# Patient Record
Sex: MALE | Race: WHITE | NOT HISPANIC OR LATINO
[De-identification: names, ages, dates, MRNs, and addresses within clinical notes are randomized per-mention and may not be internally consistent; named-entity substitution may affect disease eponyms.]

---

## 2019-04-15 PROBLEM — Z00.00 ENCOUNTER FOR PREVENTIVE HEALTH EXAMINATION: Status: ACTIVE | Noted: 2019-04-15

## 2019-04-22 ENCOUNTER — FORM ENCOUNTER (OUTPATIENT)
Age: 79
End: 2019-04-22

## 2019-04-23 ENCOUNTER — OUTPATIENT (OUTPATIENT)
Dept: OUTPATIENT SERVICES | Facility: HOSPITAL | Age: 79
LOS: 1 days | End: 2019-04-23
Payer: MEDICARE

## 2019-04-23 ENCOUNTER — APPOINTMENT (OUTPATIENT)
Dept: ORTHOPEDIC SURGERY | Facility: CLINIC | Age: 79
End: 2019-04-23
Payer: MEDICARE

## 2019-04-23 DIAGNOSIS — M17.10 UNILATERAL PRIMARY OSTEOARTHRITIS, UNSPECIFIED KNEE: ICD-10-CM

## 2019-04-23 PROCEDURE — 20610 DRAIN/INJ JOINT/BURSA W/O US: CPT | Mod: RT

## 2019-04-23 PROCEDURE — 99203 OFFICE O/P NEW LOW 30 MIN: CPT | Mod: 25

## 2019-04-23 PROCEDURE — 73562 X-RAY EXAM OF KNEE 3: CPT

## 2019-04-23 PROCEDURE — 73562 X-RAY EXAM OF KNEE 3: CPT | Mod: 26,LT,RT

## 2019-04-25 VITALS
SYSTOLIC BLOOD PRESSURE: 117 MMHG | DIASTOLIC BLOOD PRESSURE: 82 MMHG | HEART RATE: 75 BPM | HEIGHT: 68 IN | OXYGEN SATURATION: 99 % | BODY MASS INDEX: 33.34 KG/M2 | WEIGHT: 220 LBS

## 2019-04-25 PROBLEM — M17.10 ARTHRITIS OF KNEE: Status: ACTIVE | Noted: 2019-04-25

## 2019-04-25 NOTE — PHYSICAL EXAM
[de-identified] : X-ray bilateral knees, AP, lateral, sunrise:\par Mild to moderate joint space narrowing, mostly lateral compartment. No significant deformity. mild osteophyte formation. [de-identified] : General: MARLI, A&O x3, Appropriately Dressed\par Skin: Warm and Dry, Normal Turgor, no rashes\par Neuro: AOx3, Cranial nerves grossly intact\par Psych: Mood and affect appropriate\par \par Focused Examination of the RIGHT  knee: \par Mild effusion. Erythema noted at medial knee, this is reportedly chronic. \par No crepitus with ROM. \par Range of motion 0-120. \par Lateral joint line tenderness.\par Negative Skye's.\par Stable to varus and valgus stress. \par Negative anterior and posterior drawer. \par 5/5 strength TA/GS/EHL. \par Neurovascularly intact distally.\par \par Ambulates without assistive device.

## 2019-04-25 NOTE — ASSESSMENT
[FreeTextEntry1] : A 79-year-old male with right knee osteoarthritis and suspected meniscal tear and  leading to catching and locking\par \par -Right knee cortisone injection performed today, tolerated will....\par \par

## 2019-04-25 NOTE — HISTORY OF PRESENT ILLNESS
[de-identified] : 79-year-old male with a 2 week history of right knee pain, catching and locking with any history of injury or inciting event. Knee pain mostly occurs when going into full flexion, also with catching that occurs going from flexion to extension. Symptoms worse when getting up after inactivity. No difficulty with walking. No instability. Has not tried any medication. Has not had any recent physical therapy or recent injections. He did have a cortisone injection for the exact same issue 7 years ago which had resolved the issue until now.

## 2020-11-05 ENCOUNTER — APPOINTMENT (OUTPATIENT)
Dept: ORTHOPEDIC SURGERY | Facility: CLINIC | Age: 80
End: 2020-11-05
Payer: MEDICARE

## 2020-11-05 VITALS
SYSTOLIC BLOOD PRESSURE: 132 MMHG | BODY MASS INDEX: 31.83 KG/M2 | OXYGEN SATURATION: 96 % | HEART RATE: 64 BPM | HEIGHT: 68 IN | WEIGHT: 210 LBS | DIASTOLIC BLOOD PRESSURE: 64 MMHG | TEMPERATURE: 97.3 F

## 2020-11-05 DIAGNOSIS — Z72.3 LACK OF PHYSICAL EXERCISE: ICD-10-CM

## 2020-11-05 DIAGNOSIS — M79.661 PAIN IN RIGHT LOWER LEG: ICD-10-CM

## 2020-11-05 DIAGNOSIS — M25.561 PAIN IN RIGHT KNEE: ICD-10-CM

## 2020-11-05 PROCEDURE — 20611 DRAIN/INJ JOINT/BURSA W/US: CPT | Mod: RT

## 2020-11-05 PROCEDURE — 99072 ADDL SUPL MATRL&STAF TM PHE: CPT

## 2020-11-05 PROCEDURE — 99214 OFFICE O/P EST MOD 30 MIN: CPT | Mod: 25

## 2020-11-05 RX ORDER — CITALOPRAM 20 MG/1
20 TABLET, FILM COATED ORAL
Refills: 0 | Status: ACTIVE | COMMUNITY

## 2020-11-05 NOTE — PHYSICAL EXAM
[de-identified] : General: Well-nourished, well-developed, alert, and in no acute distress.\par Head: Normocephalic.\par Eyes: Pupils equal round reactive to light and accommodation, extraocular muscles intact, normal sclera.\par Nose: No nasal discharge.\par Cardiac: Regular rate. Extremities are warm and well perfused. Distal pulses are symmetric bilaterally.\par Respiratory: No labored breathing.\par Extremities: Sensation is intact distally bilaterally.  Distal pulses are symmetric bilaterally\par Lymphatic: No regional lymphadenopathy, no lymphedema\par Neurologic: No focal deficits\par Skin: Normal skin color, texture, and turgor\par Psychiatric: Normal affect\par MSK: as noted above/below\par \par \par \par RIGHT KNEE:\par \par Inspection: no bruising, erythema\par Joint Effusion:MILD\par ROM: Knee Flexion 125 WITH PAIN , Knee Extension 0\par Palpation:MEDIAL JOINT LINE PAIN, No pain at  patellar tendon, MFC/LFC, Medial/Lateral Tibial Plateau\par Leg Length Discrepancy:no\par Patella: no apprehension\par Distal Pulses: normal\par Lower Extremity Strength:normal, 5/5 \par Lower Extremity Reflexes:normal, 2+\par Lower Extremity Sensation: normal\par \par Special Tests:\par [facility]:POSITIVE MEDIALLY\par [facility]: POSITIVE MEDIALLY\par Anterior Drawer:Negative\par Posterior Drawer:Negative \par Varus/Valgus:Negative, no instability\par \par LEFT KNEE:\par \par Inspection: no bruising, swelling, erythema\par Joint Effusion:no \par ROM: Knee Flexion 130-140 , Knee Extension 0\par Palpation:No pain at joint line, patellar tendon, MFC/LFC, Medial/Lateral Tibial Plateau\par Leg Length Discrepancy:no\par Patella: no apprehension\par Distal Pulses: normal\par Lower Extremity Strength:normal, 5/5 \par Lower Extremity Reflexes:normal, 2+\par Lower Extremity Sensation: normal\par \par Special Tests:\par [facility]:Negative \par [facility]: Negative\par Anterior Drawer:Negative\par Posterior Drawer:Negative \par Varus/Valgus:Negative, no instability\par

## 2020-11-05 NOTE — PROCEDURE
[de-identified] : Ultrasound-Guided RIGHT Knee Arthrocentesis\par \par Indication for U/S Guidance: Ensure placement within the tibiofemoral joint for diagnostic purposes, while avoiding neurovascular structures\par \par Indication for Injection: KNEE EFFUSION\par \par A discussion was had with the patient regarding this procedure and all questions were answered. All risks, benefits and alternatives were discussed. These included but were not limited to bleeding, infection, and allergic reaction. A timeout was done to ensure correct side and pt agreed to the procedure. Betadine was used to sterilize and prep the area, and alcohol was used to clean the skin in the anterior aspect of the knee joint. The suprapatellar space was visualized utilizing the Sonosite, linear transducer. The joint was visualized in the short axis and an in-plane approach was used for the injection. Ultrasound guidance was utilized to ensure accuracy of the intra-articular aspiration, and avoid the neurovascular structures. A 25-gauge 1.5" needle was used to inject 4cc of 1% lidocaine without epi.  This was followed by aspiration with an 18-gauge 1.5" needle with aspiration of 17CC OF NONPURULENT, CLOUDY YELLOW SYNOVIAL FLUID.. A sterile bandage was then applied. The patient tolerated the procedure well and there were no complications.\par

## 2020-11-05 NOTE — HISTORY OF PRESENT ILLNESS
[de-identified] : The patient is a 80 year old man presenting with right knee pain.\par \par He was previously seen by Dr. Poon in 2019 for right knee OA and had a cortisone injection.\par \par He presents with a three week history of atraumatic, right medial knee pain after wearing new dress shoes.  The pain started with some posterior knee/calf pain.The patient denies precipitating injury or trauma.  The patient denies mechanical symptoms including catching, locking, buckling.  He endorses mild swelling.  Pain is similar to prior episodes but more severe.  He endorses swelling.  He has been using a walking cane.  He denies focal hip or low back pain.  He has develooped some calf pain and swelling.  He denies history of DVT.\par \par Pain is rated 8/10, described as sharp, improved with rest, worse with walking.\par \par  [8] : a current pain level of 8/10

## 2020-11-05 NOTE — DISCUSSION/SUMMARY
[Medication Risks Reviewed] : Medication risks reviewed [de-identified] : The patient is a 80 year old man presenting with a 3 week history of right knee pain and swelling.  Likely component of knee OA.  He has recurrent history of symptoms, cannot rule out displaced meniscal tear vs. insufficiency fracture.\par \par MRI of the right knee ordered today.\par \par Patient was prescribed a short course of Etodolac .Appropriate use of medication was reviewed with the patient in detail. Risks, benefits, and adverse effects medication were discussed.\par \par The patient was counseled on activity modification.\par \par He also has right calf pain and swelling.\par \par RLE Doppler ordered today to rule out DVT.  Patient counseled on red flags warranting presentation to ED.\par \par Follow-up after imaging.\par \par ------------------------------------------------------------------------------------------------------------------\par Patient appreciates and agrees with current plan.\par \par This note was generated using a mixture of manual typing and dragon medical dictation software.  A reasonable effort has been made for proofreading its contents, but typos may still remain.  If there are any questions or points of clarification needed please notify my office.\par \par >45 minutes of time was spent on total encounter.  >50% of the visit was spent on counseling/coordination of care and medical-decision making for this patient.\par \par \par

## 2020-11-19 ENCOUNTER — APPOINTMENT (OUTPATIENT)
Dept: ORTHOPEDIC SURGERY | Facility: CLINIC | Age: 80
End: 2020-11-19
Payer: MEDICARE

## 2020-11-19 PROCEDURE — 99213 OFFICE O/P EST LOW 20 MIN: CPT

## 2020-11-19 NOTE — DISCUSSION/SUMMARY
[Medication Risks Reviewed] : Medication risks reviewed [de-identified] : The patient is a 80 year old man presenting with a 5 week history of right knee pain and swelling likely secondary to progression of knee osteoarthritis with now mild insufficiency fracture/stress reaction of medial tibial plateau.\par \par Imaging/Diagnostics were interpreted and results were reviewed with the patient in detail.  All questions were answered appropriately.\par \par The patient was counseled on the natural progression of the problem today.  \par \par We discussed trial of bracing.  Patient provided prescription for right medial  brace.  Continue walking cane.\par \par Patient instructed to start vitamin D supplementation.\par \par Follow-up in 4 weeks with repeat Xray or sooner if change in symptoms.  We will consider HA injections in the future.\par \par ------------------------------------------------------------------------------------------------------------------\par Patient appreciates and agrees with current plan.\par \par This note was generated using a mixture of manual typing and dragon medical dictation software.  A reasonable effort has been made for proofreading its contents, but typos may still remain.  If there are any questions or points of clarification needed please notify my office.\par \par >15 minutes of time was spent on total encounter.  >50% of the visit was spent on counseling/coordination of care and medical-decision making for this patient.\par \par \par

## 2020-11-19 NOTE — HISTORY OF PRESENT ILLNESS
[de-identified] : The patient is a 80 year old man presenting for follow-up for right knee pain.\par \par He was previously seen by Dr. Poon in 2019 for right knee OA and had a cortisone injection.\par \par The patient was last seen about 2 weeks ago for a three week history of atraumatic, right medial knee pain after wearing new dress shoes.  The pain started with some posterior knee/calf pain.  He was sent for LE doppler which was negative for DVT.  He was also sent for MRI of right knee showing:\par \par Complex degenerative type tear of the posterior root, horn, and free edge of the body of the lateral meniscus with partial extrusion of the body. Degenerative type tear of the body, posterior horn, root attachment of the medial meniscus with medial extrusion. Acute on chronic grade 1 MCL sprain. Advanced medial compartment chondromalacia. Subchondral edema in the medial tibial plateau which may represent bone contusion, stress reaction, or insufficiency changes. 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 Moderate effusion with synovitis and anterior soft tissue swelling. Popliteus muscle strain.\par \par He was counseled on modified weightbearing with a walking cane.\par \par

## 2020-11-19 NOTE — PHYSICAL EXAM
[de-identified] : General: Well-nourished, well-developed, alert, and in no acute distress.\par Head: Normocephalic.\par Eyes: Pupils equal round reactive to light and accommodation, extraocular muscles intact, normal sclera.\par Nose: No nasal discharge.\par Cardiac: Regular rate. Extremities are warm and well perfused. Distal pulses are symmetric bilaterally.\par Respiratory: No labored breathing.\par Extremities: Sensation is intact distally bilaterally.  Distal pulses are symmetric bilaterally\par Lymphatic: No regional lymphadenopathy, no lymphedema\par Neurologic: No focal deficits\par Skin: Normal skin color, texture, and turgor\par Psychiatric: Normal affect\par MSK: as noted above/below\par \par \par \par RIGHT KNEE:\par \par Inspection: no bruising, erythema\par Joint Effusion:MILD\par ROM: Knee Flexion 125 WITH PAIN , Knee Extension 0\par Palpation:MEDIAL JOINT LINE PAIN, PAIN AT MEDIAL TIBIAL PLATEAU, No pain at  patellar tendon, MFC/LFC, Lateral Tibial Plateau\par Leg Length Discrepancy:no\par Patella: no apprehension\par Distal Pulses: normal\par Lower Extremity Strength:normal, 5/5 \par Lower Extremity Reflexes:normal, 2+\par Lower Extremity Sensation: normal\par \par Special Tests:\par [facility]:POSITIVE MEDIALLY\par [facility]: POSITIVE MEDIALLY\par Anterior Drawer:Negative\par Posterior Drawer:Negative \par Varus/Valgus:Negative, no instability\par \par LEFT KNEE:\par \par Inspection: no bruising, swelling, erythema\par Joint Effusion:no \par ROM: Knee Flexion 130-140 , Knee Extension 0\par Palpation:No pain at joint line, patellar tendon, MFC/LFC, Medial/Lateral Tibial Plateau\par Leg Length Discrepancy:no\par Patella: no apprehension\par Distal Pulses: normal\par Lower Extremity Strength:normal, 5/5 \par Lower Extremity Reflexes:normal, 2+\par Lower Extremity Sensation: normal\par \par Special Tests:\par [facility]:Negative \par [facility]: Negative\par Anterior Drawer:Negative\par Posterior Drawer:Negative \par Varus/Valgus:Negative, no instability\par  [de-identified] : MRI Right Knee - Multiple views were reviewed with the patient in detail. \par \par Complex degenerative type tear of the posterior root, horn, and free edge of the body of the lateral meniscus with partial extrusion of the body. Degenerative type tear of the body, posterior horn, root attachment of the medial meniscus with medial extrusion. Acute on chronic grade 1 MCL sprain. Advanced medial compartment chondromalacia. Subchondral edema in the medial tibial plateau which may represent bone contusion, stress reaction, or insufficiency changes. 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 Moderate effusion with synovitis and anterior soft tissue swelling. Popliteus muscle strain.\par

## 2020-12-17 ENCOUNTER — APPOINTMENT (OUTPATIENT)
Dept: ORTHOPEDIC SURGERY | Facility: CLINIC | Age: 80
End: 2020-12-17
Payer: MEDICARE

## 2020-12-17 VITALS
HEART RATE: 59 BPM | OXYGEN SATURATION: 98 % | HEIGHT: 68 IN | DIASTOLIC BLOOD PRESSURE: 70 MMHG | BODY MASS INDEX: 31.83 KG/M2 | TEMPERATURE: 98 F | SYSTOLIC BLOOD PRESSURE: 110 MMHG | WEIGHT: 210 LBS

## 2020-12-17 PROCEDURE — 99072 ADDL SUPL MATRL&STAF TM PHE: CPT

## 2020-12-17 PROCEDURE — 99213 OFFICE O/P EST LOW 20 MIN: CPT

## 2021-01-11 ENCOUNTER — APPOINTMENT (OUTPATIENT)
Dept: ORTHOPEDIC SURGERY | Facility: CLINIC | Age: 81
End: 2021-01-11
Payer: MEDICARE

## 2021-01-11 VITALS
TEMPERATURE: 97.4 F | HEIGHT: 68 IN | SYSTOLIC BLOOD PRESSURE: 110 MMHG | OXYGEN SATURATION: 98 % | HEART RATE: 72 BPM | BODY MASS INDEX: 31.83 KG/M2 | WEIGHT: 210 LBS | DIASTOLIC BLOOD PRESSURE: 70 MMHG

## 2021-01-11 PROCEDURE — 20610 DRAIN/INJ JOINT/BURSA W/O US: CPT | Mod: LT

## 2021-01-11 PROCEDURE — 99072 ADDL SUPL MATRL&STAF TM PHE: CPT

## 2021-01-11 NOTE — HISTORY OF PRESENT ILLNESS
[de-identified] : The patient is a 80 year old man presenting for follow-up for right knee pain.\par \par He was previously seen by Dr. Poon in 2019 for right knee OA and had a cortisone injection.\par \par The patient was last seen about 4 weeks ago for a now 7 week history of atraumatic, right medial knee pain after wearing new dress shoes.  The pain started with some posterior knee/calf pain.  He was sent for LE doppler which was negative for DVT.  He was also sent for MRI of right knee showing:\par \par Complex degenerative type tear of the posterior root, horn, and free edge of the body of the lateral meniscus with partial extrusion of the body. Degenerative type tear of the body, posterior horn, root attachment of the medial meniscus with medial extrusion. Acute on chronic grade 1 MCL sprain. Advanced medial compartment chondromalacia. Subchondral edema in the medial tibial plateau which may represent bone contusion, stress reaction, or insufficiency changes. 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 Moderate effusion with synovitis and anterior soft tissue swelling. Popliteus muscle strain.\par \par He was counseled on modified weightbearing with a walking cane, and use of a  brace.  Unfortunately, he was unable to afford the  brace.  His pain has improved about 60%, with relative rest, and he has started to wean off the cane.\par \par

## 2021-01-11 NOTE — PHYSICAL EXAM
[de-identified] : General: Well-nourished, well-developed, alert, and in no acute distress.\par Head: Normocephalic.\par Eyes: Pupils equal round reactive to light and accommodation, extraocular muscles intact, normal sclera.\par Nose: No nasal discharge.\par Cardiac: Regular rate. Extremities are warm and well perfused. Distal pulses are symmetric bilaterally.\par Respiratory: No labored breathing.\par Extremities: Sensation is intact distally bilaterally.  Distal pulses are symmetric bilaterally\par Lymphatic: No regional lymphadenopathy, no lymphedema\par Neurologic: No focal deficits\par Skin: Normal skin color, texture, and turgor\par Psychiatric: Normal affect\par MSK: as noted above/below\par \par \par \par RIGHT KNEE:\par \par Inspection: no bruising, erythema\par Joint Effusion:TRACE\par ROM: Knee Flexion 130 WITH PAIN , Knee Extension 0\par Palpation:MEDIAL JOINT LINE PAIN, PAIN AT MEDIAL TIBIAL PLATEAU, No pain at  patellar tendon, MFC/LFC, Lateral Tibial Plateau\par Leg Length Discrepancy:no\par Patella: no apprehension\par Distal Pulses: normal\par Lower Extremity Strength:normal, 5/5 \par Lower Extremity Reflexes:normal, 2+\par Lower Extremity Sensation: normal\par \par Special Tests:\par [facility]:POSITIVE MEDIALLY\par [facility]: POSITIVE MEDIALLY\par Anterior Drawer:Negative\par Posterior Drawer:Negative \par Varus/Valgus:Negative, no instability\par \par LEFT KNEE:\par \par Inspection: no bruising, swelling, erythema\par Joint Effusion:no \par ROM: Knee Flexion 130-140 , Knee Extension 0\par Palpation:No pain at joint line, patellar tendon, MFC/LFC, Medial/Lateral Tibial Plateau\par Leg Length Discrepancy:no\par Patella: no apprehension\par Distal Pulses: normal\par Lower Extremity Strength:normal, 5/5 \par Lower Extremity Reflexes:normal, 2+\par Lower Extremity Sensation: normal\par \par Special Tests:\par [facility]:Negative \par [facility]: Negative\par Anterior Drawer:Negative\par Posterior Drawer:Negative \par Varus/Valgus:Negative, no instability\par

## 2021-01-11 NOTE — DISCUSSION/SUMMARY
[Medication Risks Reviewed] : Medication risks reviewed [de-identified] : The patient is a 80 year old man presenting with a 7 week history of right knee pain and swelling likely secondary to progression of knee osteoarthritis with now mild insufficiency fracture/stress reaction of medial tibial plateau.\par \par Imaging/Diagnostics were interpreted and results were reviewed with the patient in detail.  All questions were answered appropriately.\par \par The patient was counseled on the natural progression of the problem today.  \par \par We discussed hinged knee brace as alternative to  brace.  He was given prescription today, and options for OTC braces.\par \par Patient instructed to continue vitamin D supplementation.\par \par The patient was also provided some general home exercises specifically quad sets to avoid quad atrophy.  The patient was counseled on activity modification.\par \par We will trial HA injections.  Gel-One ordered for right knee today.  If not approved by insurance, may consider cortisone injection.\par \par Patient understands that if symptoms progress, he may require surgery in the future.\par \par Follow-up in 4 weeks.\par ------------------------------------------------------------------------------------------------------------------\par Patient appreciates and agrees with current plan.\par \par This note was generated using a mixture of manual typing and dragon medical dictation software.  A reasonable effort has been made for proofreading its contents, but typos may still remain.  If there are any questions or points of clarification needed please notify my office.\par \par >15 minutes of time was spent on total encounter.  >50% of the visit was spent on counseling/coordination of care and medical-decision making for this patient.\par \par \par

## 2021-01-11 NOTE — RETURN TO WORK/SCHOOL
[Full Duty] : full duty [___ Weeks] : I will re-evaluate the patient in [unfilled] week(s), at which time I will provide an update to their current status

## 2021-01-14 ENCOUNTER — APPOINTMENT (OUTPATIENT)
Dept: ORTHOPEDIC SURGERY | Facility: CLINIC | Age: 81
End: 2021-01-14

## 2021-02-09 ENCOUNTER — APPOINTMENT (OUTPATIENT)
Dept: ORTHOPEDIC SURGERY | Facility: CLINIC | Age: 81
End: 2021-02-09
Payer: MEDICARE

## 2021-02-09 VITALS
BODY MASS INDEX: 31.83 KG/M2 | HEIGHT: 68 IN | SYSTOLIC BLOOD PRESSURE: 114 MMHG | HEART RATE: 62 BPM | WEIGHT: 210 LBS | OXYGEN SATURATION: 98 % | DIASTOLIC BLOOD PRESSURE: 72 MMHG

## 2021-02-09 VITALS — TEMPERATURE: 95.7 F

## 2021-02-09 DIAGNOSIS — M65.9 SYNOVITIS AND TENOSYNOVITIS, UNSPECIFIED: ICD-10-CM

## 2021-02-09 PROCEDURE — 20611 DRAIN/INJ JOINT/BURSA W/US: CPT | Mod: RT

## 2021-02-09 PROCEDURE — 99212 OFFICE O/P EST SF 10 MIN: CPT | Mod: 25

## 2021-02-09 PROCEDURE — 99072 ADDL SUPL MATRL&STAF TM PHE: CPT

## 2021-02-09 NOTE — DISCUSSION/SUMMARY
[Medication Risks Reviewed] : Medication risks reviewed [de-identified] : The patient is a 80 year old man presenting with a 3 month history of right knee pain and swelling likely secondary to progression of knee osteoarthritis, initially treated for mild insufficiency fracture/stress reaction of medial tibial plateau.\par \par Patient again counseled on natural progression of knee OA and treatment options.\par \par After informed consent, and explanation of risks, benefits, alternatives, adverse effects of injection, which includes but is not limited to infection, bleeding, allergic reaction, swelling, soft tissue weakening/tendon rupture, injection site complication, fat atrophy, skin depigmentation, failure to improve symptoms, the patient would like to proceed with the procedure - RIGHT KNEE ULTRASOUND-GUIDED CORTICOSTEROID INJECTION. See procedure note above. Patient tolerated the procedure well. The patient was provided with postinjection instructions.\par \par Patient understands that he may require surgery in the future.\par \par Follow-up in 6-8 weeks.  Will repeat standing knee xrays.  Pending symptom progression and repeat xrays, consider referral to Orthopedic Surgery, Dr. Poon.\par \par \par \par Patient understands that if symptoms progress, he may require surgery in the future.\par \par \par ------------------------------------------------------------------------------------------------------------------\par Patient appreciates and agrees with current plan.\par \par This note was generated using a mixture of manual typing and dragon medical dictation software.  A reasonable effort has been made for proofreading its contents, but typos may still remain.  If there are any questions or points of clarification needed please notify my office.\par \par >15 minutes of time was spent on total encounter.  >50% of the visit was spent on counseling/coordination of care and medical-decision making for this patient.\par \par \par

## 2021-02-09 NOTE — PHYSICAL EXAM
[de-identified] : General: Well-nourished, well-developed, alert, and in no acute distress.\par Head: Normocephalic.\par Eyes: Pupils equal round reactive to light and accommodation, extraocular muscles intact, normal sclera.\par Nose: No nasal discharge.\par Cardiac: Regular rate. Extremities are warm and well perfused. Distal pulses are symmetric bilaterally.\par Respiratory: No labored breathing.\par Extremities: Sensation is intact distally bilaterally.  Distal pulses are symmetric bilaterally\par Lymphatic: No regional lymphadenopathy, no lymphedema\par Neurologic: No focal deficits\par Skin: Normal skin color, texture, and turgor\par Psychiatric: Normal affect\par MSK: as noted above/below\par \par \par \par RIGHT KNEE:\par \par Inspection: no bruising, erythema\par Joint Effusion:TRACE\par ROM: Knee Flexion 130 WITH PAIN , Knee Extension 0\par Palpation:MEDIAL JOINT LINE PAIN, No pain at  patellar tendon, MFC/LFC, Lateral Tibial Plateau\par Leg Length Discrepancy:no\par Patella: no apprehension\par Distal Pulses: normal\par Lower Extremity Strength:normal, 5/5 \par Lower Extremity Reflexes:normal, 2+\par Lower Extremity Sensation: normal\par \par Special Tests:\par [facility]:NEGATIVE\par [facility]: POSITIVE MEDIALLY\par Anterior Drawer:Negative\par Posterior Drawer:Negative \par Varus/Valgus:Negative, no instability\par \par LEFT KNEE:\par \par Inspection: no bruising, swelling, erythema\par Joint Effusion:no \par ROM: Knee Flexion 130-140 , Knee Extension 0\par Palpation:No pain at joint line, patellar tendon, MFC/LFC, Medial/Lateral Tibial Plateau\par Leg Length Discrepancy:no\par Patella: no apprehension\par Distal Pulses: normal\par Lower Extremity Strength:normal, 5/5 \par Lower Extremity Reflexes:normal, 2+\par Lower Extremity Sensation: normal\par \par Special Tests:\par [facility]:Negative \par [facility]: Negative\par Anterior Drawer:Negative\par Posterior Drawer:Negative \par Varus/Valgus:Negative, no instability\par

## 2021-02-09 NOTE — HISTORY OF PRESENT ILLNESS
[de-identified] : The patient is a 80 year old man presenting for follow-up for right knee pain.\par \par He was previously seen by Dr. Poon in 2019 for right knee OA and had a cortisone injection.\par \par The patient was last seen about 4 weeks ago for a now 3 month history of atraumatic, right medial knee pain after wearing new dress shoes.  The pain started with some posterior knee/calf pain.  He was sent for LE doppler which was negative for DVT.  He was also sent for MRI of right knee showing:\par \par Complex degenerative type tear of the posterior root, horn, and free edge of the body of the lateral meniscus with partial extrusion of the body. Degenerative type tear of the body, posterior horn, root attachment of the medial meniscus with medial extrusion. Acute on chronic grade 1 MCL sprain. Advanced medial compartment chondromalacia. Subchondral edema in the medial tibial plateau which may represent bone contusion, stress reaction, or insufficiency changes. 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 Moderate effusion with synovitis and anterior soft tissue swelling. Popliteus muscle strain.\par \par He was counseled on modified weightbearing with a walking cane, and use of a  brace.  Unfortunately, he was unable to afford the  brace.  His pain has improved about 60%, with relative rest, and he has started to wean off the cane.  At his last visit, he had a Gel-One injection which provided moderate relief.  He still has minor swelling and residual pain, worse with stair use.  He has weaned off walking cane.\par \par

## 2021-02-09 NOTE — PROCEDURE
[de-identified] : Ultrasound-Guided RIGHT Knee Arthrocentesis\par \par Indication for U/S Guidance: Ensure placement within the tibiofemoral joint for diagnostic purposes, while avoiding neurovascular structures\par \par Indication for Injection: KNEE OSTEOARTHRITIS\par \par A discussion was had with the patient regarding this procedure and all questions were answered. All risks, benefits and alternatives were discussed. These included but were not limited to bleeding, infection, and allergic reaction. A timeout was done to ensure correct side and pt agreed to the procedure. Betadine was used to sterilize and prep the area, and alcohol was used to clean the skin in the anterior aspect of the knee joint. The suprapatellar space was visualized utilizing the Sonosite, linear transducer. The joint was visualized in the short axis and an in-plane approach was used for the injection. Ultrasound guidance was utilized to ensure accuracy of the intra-articular aspiration, and avoid the neurovascular structures. A 25-gauge 1.5" needle was used to inject 2cc of 1% lidocaine without epi into the SubQ.  This was followed by aspiration with an 18-gauge 1.5" needle with aspiration of 10cc of nonpurulent, clear yellow synovial fluid.  This was followed by 1cc of 1% lidocaine without epinephrine, 1cc of 0.5% bupivicaine and 1cc of KENALOG.  . A sterile bandage was then applied. The patient tolerated the procedure well and there were no complications.\par

## 2021-03-08 ENCOUNTER — APPOINTMENT (OUTPATIENT)
Dept: ORTHOPEDIC SURGERY | Facility: CLINIC | Age: 81
End: 2021-03-08

## 2021-04-06 ENCOUNTER — APPOINTMENT (OUTPATIENT)
Dept: ORTHOPEDIC SURGERY | Facility: CLINIC | Age: 81
End: 2021-04-06

## 2021-04-15 ENCOUNTER — APPOINTMENT (OUTPATIENT)
Dept: ORTHOPEDIC SURGERY | Facility: CLINIC | Age: 81
End: 2021-04-15

## 2021-04-22 ENCOUNTER — APPOINTMENT (OUTPATIENT)
Dept: ORTHOPEDIC SURGERY | Facility: CLINIC | Age: 81
End: 2021-04-22
Payer: MEDICARE

## 2021-04-22 VITALS
BODY MASS INDEX: 31.83 KG/M2 | HEIGHT: 68 IN | TEMPERATURE: 98 F | OXYGEN SATURATION: 98 % | DIASTOLIC BLOOD PRESSURE: 70 MMHG | HEART RATE: 61 BPM | SYSTOLIC BLOOD PRESSURE: 134 MMHG | WEIGHT: 210 LBS

## 2021-04-22 PROCEDURE — 20611 DRAIN/INJ JOINT/BURSA W/US: CPT | Mod: RT

## 2021-04-22 PROCEDURE — 99072 ADDL SUPL MATRL&STAF TM PHE: CPT

## 2021-04-22 PROCEDURE — 99212 OFFICE O/P EST SF 10 MIN: CPT | Mod: 25

## 2021-04-22 NOTE — DISCUSSION/SUMMARY
[de-identified] : The patient is a 80 year old man presenting with a 4 month history of right knee pain and swelling likely secondary to progression of knee osteoarthritis, initially treated for mild insufficiency fracture/stress reaction of medial tibial plateau.\par \par He also has mild left knee OA.\par \par We again discussed treatment options for knee OA.  Patient understands that they may require surgery in the future.  He does not desire surgery at this time.\par \par He defers supervised PT.\par \par After informed consent, and explanation of risks, benefits, alternatives, adverse effects of injection, which includes but is not limited to infection, bleeding, allergic reaction, swelling, soft tissue weakening/tendon rupture, injection site complication, fat atrophy, skin depigmentation, failure to improve symptoms, the patient would like to proceed with the procedure - RIGHT KNEE ULTRASOUND-GUIDED ARTHROCENTESIS. See procedure note above. Patient tolerated the procedure well. The patient was provided with postinjection instructions.\par \par Trial HA injection left knee.  Repeat HA for right knee in 3 months\par \par MONOVISC ordered today.\par \par ------------------------------------------------------------------------------------------------------------------\par Patient appreciates and agrees with current plan.\par \par The patient's diagnosis above was evaluated by me, personally.  Diagnostic Testing and treatment options were discussed with the patient in detail.  The risks/benefits/potential complications of diagnostic testing/treatments were described in detail.  \par \par This note was generated using a mixture of manual typing and dragon medical dictation software.  A reasonable effort has been made for proofreading its contents, but typos may still remain.  If there are any questions or points of clarification needed please notify my office.\par \par \par >15 minutes of time was spent in total for the encounter.  >50% of the time spent was on face-to-face counseling/coordination of care and medical-decision making for this patient.\par \par \par \par \par ------------------------------------------------------------------------------------------------------------------\par Patient appreciates and agrees with current plan.\par \par This note was generated using a mixture of manual typing and dragon medical dictation software.  A reasonable effort has been made for proofreading its contents, but typos may still remain.  If there are any questions or points of clarification needed please notify my office.\par \par >15 minutes of time was spent on total encounter.  >50% of the visit was spent on counseling/coordination of care and medical-decision making for this patient.\par \par \par

## 2021-04-22 NOTE — HISTORY OF PRESENT ILLNESS
[de-identified] : The patient is a 80 year old man presenting for follow-up for right knee pain.\par \par He was previously seen by Dr. Poon in 2019 for right knee OA and had a cortisone injection.\par \par The patient was last seen about 8 weeks ago for a now 4 month history of atraumatic, right medial knee pain after wearing new dress shoes.  The pain started with some posterior knee/calf pain.  He was sent for LE doppler which was negative for DVT.  He was also sent for MRI of right knee showing:\par \par Complex degenerative type tear of the posterior root, horn, and free edge of the body of the lateral meniscus with partial extrusion of the body. Degenerative type tear of the body, posterior horn, root attachment of the medial meniscus with medial extrusion. Acute on chronic grade 1 MCL sprain. Advanced medial compartment chondromalacia. Subchondral edema in the medial tibial plateau which may represent bone contusion, stress reaction, or insufficiency changes. 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 Moderate effusion with synovitis and anterior soft tissue swelling. Popliteus muscle strain.\par \par He was counseled on modified weightbearing with a walking cane, and use of a  brace.  Unfortunately, he was unable to afford the  brace.  This was followed by Gel-One injection, and ultimately a corticosteroid injection.  \par \par He feels about 50% with walking and ~30% with stair use.  HA injections provided moderate relief.\par \par

## 2021-04-22 NOTE — PHYSICAL EXAM
[de-identified] : General: Well-nourished, well-developed, alert, and in no acute distress.\par Head: Normocephalic.\par Eyes: Pupils equal round reactive to light and accommodation, extraocular muscles intact, normal sclera.\par Nose: No nasal discharge.\par Cardiac: Regular rate. Extremities are warm and well perfused. Distal pulses are symmetric bilaterally.\par Respiratory: No labored breathing.\par Extremities: Sensation is intact distally bilaterally.  Distal pulses are symmetric bilaterally\par Lymphatic: No regional lymphadenopathy, no lymphedema\par Neurologic: No focal deficits\par Skin: Normal skin color, texture, and turgor\par Psychiatric: Normal affect\par MSK: as noted above/below\par \par \par \par RIGHT KNEE:\par \par Inspection: no bruising, erythema\par Joint Effusion:MODERATE\par ROM: Knee Flexion 125 , Knee Extension 0\par Palpation:MEDIAL JOINT LINE PAIN, No pain at  patellar tendon, MFC/LFC, Lateral Tibial Plateau\par Leg Length Discrepancy:no\par Patella: no apprehension\par Distal Pulses: normal\par Lower Extremity Strength:normal, 5/5 \par Lower Extremity Reflexes:normal, 2+\par Lower Extremity Sensation: normal\par \par Special Tests:\par [facility]:NEGATIVE\par Bradley: NEGATIVE\par Anterior Drawer:Negative\par Posterior Drawer:Negative \par Varus/Valgus:Negative, no instability\par \par LEFT KNEE:\par \par Inspection: no bruising, swelling, erythema\par Joint Effusion:no \par ROM: Knee Flexion 130-140 , Knee Extension 0\par Palpation:No pain at joint line, patellar tendon, MFC/LFC, Medial/Lateral Tibial Plateau\par Leg Length Discrepancy:no\par Patella: no apprehension\par Distal Pulses: normal\par Lower Extremity Strength:normal, 5/5 \par Lower Extremity Reflexes:normal, 2+\par Lower Extremity Sensation: normal\par \par Special Tests:\par [facility]:Negative \par Bradley: Negative\par Anterior Drawer:Negative\par Posterior Drawer:Negative \par Varus/Valgus:Negative, no instability\par

## 2021-04-22 NOTE — PROCEDURE
[de-identified] : Ultrasound-Guided RIGHT Knee Arthrocentesis\par \par Indication for U/S Guidance: Ensure placement within the tibiofemoral joint for diagnostic purposes, while avoiding neurovascular structures\par \par Indication for Injection: KNEE EFFUSION, KNEE OA\par \par A discussion was had with the patient regarding this procedure and all questions were answered. All risks, benefits and alternatives were discussed. These included but were not limited to bleeding, infection, and allergic reaction. A timeout was done to ensure correct side and pt agreed to the procedure. Betadine was used to sterilize and prep the area, and alcohol was used to clean the skin in the anterior aspect of the knee joint. The suprapatellar space was visualized utilizing the Sonosite, linear transducer. The joint was visualized in the short axis and an in-plane approach was used for the injection. Ultrasound guidance was utilized to ensure accuracy of the intra-articular aspiration, and avoid the neurovascular structures. A 25-gauge 1.5" needle was used to inject 2cc of 1% lidocaine without epi into the SubQ.  This was followed by aspiration with an 18-gauge 1.5" needle with aspiration of 20cc of nonpurulent, clear yellow synovial fluid. . A sterile bandage was then applied. The patient tolerated the procedure well and there were no complications.\par

## 2022-03-07 ENCOUNTER — OUTPATIENT (OUTPATIENT)
Dept: OUTPATIENT SERVICES | Facility: HOSPITAL | Age: 82
LOS: 1 days | End: 2022-03-07
Payer: MEDICARE

## 2022-03-07 ENCOUNTER — RESULT REVIEW (OUTPATIENT)
Age: 82
End: 2022-03-07

## 2022-03-07 ENCOUNTER — EMERGENCY (EMERGENCY)
Facility: HOSPITAL | Age: 82
LOS: 1 days | Discharge: HOME | End: 2022-03-07
Admitting: EMERGENCY MEDICINE
Payer: MEDICARE

## 2022-03-07 ENCOUNTER — APPOINTMENT (OUTPATIENT)
Dept: ORTHOPEDIC SURGERY | Facility: CLINIC | Age: 82
End: 2022-03-07
Payer: MEDICARE

## 2022-03-07 VITALS
HEART RATE: 62 BPM | DIASTOLIC BLOOD PRESSURE: 67 MMHG | HEIGHT: 68 IN | RESPIRATION RATE: 18 BRPM | WEIGHT: 220.02 LBS | TEMPERATURE: 98 F | OXYGEN SATURATION: 98 % | SYSTOLIC BLOOD PRESSURE: 135 MMHG

## 2022-03-07 VITALS
HEIGHT: 68 IN | SYSTOLIC BLOOD PRESSURE: 142 MMHG | WEIGHT: 210 LBS | DIASTOLIC BLOOD PRESSURE: 84 MMHG | TEMPERATURE: 97.9 F | BODY MASS INDEX: 31.83 KG/M2 | HEART RATE: 66 BPM | OXYGEN SATURATION: 97 %

## 2022-03-07 VITALS
RESPIRATION RATE: 18 BRPM | SYSTOLIC BLOOD PRESSURE: 141 MMHG | HEART RATE: 60 BPM | TEMPERATURE: 98 F | DIASTOLIC BLOOD PRESSURE: 81 MMHG | OXYGEN SATURATION: 97 %

## 2022-03-07 DIAGNOSIS — M17.11 UNILATERAL PRIMARY OSTEOARTHRITIS, RIGHT KNEE: ICD-10-CM

## 2022-03-07 DIAGNOSIS — M17.12 UNILATERAL PRIMARY OSTEOARTHRITIS, LEFT KNEE: ICD-10-CM

## 2022-03-07 PROCEDURE — 99212 OFFICE O/P EST SF 10 MIN: CPT | Mod: 25

## 2022-03-07 PROCEDURE — 73564 X-RAY EXAM KNEE 4 OR MORE: CPT | Mod: 26,50

## 2022-03-07 PROCEDURE — 99282 EMERGENCY DEPT VISIT SF MDM: CPT

## 2022-03-07 PROCEDURE — 20611 DRAIN/INJ JOINT/BURSA W/US: CPT | Mod: LT

## 2022-03-07 PROCEDURE — 73564 X-RAY EXAM KNEE 4 OR MORE: CPT

## 2022-03-07 NOTE — ED ADULT NURSE NOTE - DRUG PRE-SCREENING (DAST -1)
[facility]  [address] [address]  Phone: [phone]  Fax: [phone]    Eugene Selby        January 24, 2022     Patient: Oseas Kilgore   YOB: 1983   Date of Visit: 1/24/2022       To Whom it May Concern:    Ave Craven was seen in my clinic on 1/24/2022. He may return to work without any restrictions. If you have any questions or concerns, please don't hesitate to call.     Sincerely,         Jenelle Howard DPM Statement Selected

## 2022-03-07 NOTE — ED PROVIDER NOTE - PHYSICAL EXAMINATION
CONSTITUTIONAL: Well-developed; well-nourished; in no acute distress.  SKIN: Warm and dry, no acute rash.  HEAD: Normocephalic; atraumatic.  EYES: PERRL, EOM intact; conjunctiva and sclera clear.  ENT: No nasal discharge; airway clear.  NECK: Supple; non tender.  CARD: S1, S2 normal; no murmurs, gallops, or rubs. Regular rate and rhythm.  RESP: No wheezes, rales or rhonchi.  ABD: Normal bowel sounds; soft; non-distended; non-tender; no hepatosplenomegaly.  EXT: Normal ROM. No clubbing, cyanosis or edema. Sensation and reflex intact. No skin changes, no ttp or increased warmth, 2+ pulse and good cap refill  LYMPH: No acute cervical adenopathy.  NEURO: Alert, oriented. Grossly unremarkable.  PSYCH: Cooperative, appropriate.

## 2022-03-07 NOTE — ED PROVIDER NOTE - NSFOLLOWUPINSTRUCTIONS_ED_ALL_ED_FT
Return to the Emergency Department if you have any new or worsening symptoms, or if you have any concerns.    Please reach out to Coco Walls ([facility] ED clinical referral coordinator) to assist you with your follow-up appointment with Podiatry.     Monday - Friday 11am-7pm  [phone]  [email or web address]

## 2022-03-07 NOTE — PHYSICAL EXAM
[de-identified] : General: Well-nourished, well-developed, alert, and in no acute distress.\par Head: Normocephalic.\par Eyes: Pupils equal round reactive to light and accommodation, extraocular muscles intact, normal sclera.\par Nose: No nasal discharge.\par Cardiac: Regular rate. Extremities are warm and well perfused. Distal pulses are symmetric bilaterally.\par Respiratory: No labored breathing.\par Extremities: Sensation is intact distally bilaterally.  Distal pulses are symmetric bilaterally\par Lymphatic: No regional lymphadenopathy, no lymphedema\par Neurologic: No focal deficits\par Skin: Normal skin color, texture, and turgor\par Psychiatric: Normal affect\par MSK: as noted above/below\par \par \par \par RIGHT KNEE:\par \par Inspection: no bruising, erythema\par Joint Effusion:NONE\par ROM: Knee Flexion 125 , Knee Extension 0\par Palpation:MEDIAL JOINT LINE PAIN, No pain at  patellar tendon, MFC/LFC, Lateral Tibial Plateau\par Leg Length Discrepancy:no\par Patella: no apprehension\par Distal Pulses: normal\par Lower Extremity Strength:normal, 5/5 \par Lower Extremity Reflexes:normal, 2+\par Lower Extremity Sensation: normal\par \par Special Tests:\par [facility]:NEGATIVE\par Bradley: NEGATIVE\par Anterior Drawer:Negative\par Posterior Drawer:Negative \par Varus/Valgus:Negative, no instability\par \par LEFT KNEE:\par \par Inspection: no bruising, erythema\par Joint Effusion:MILD\par ROM: Knee Flexion 120 , Knee Extension 0\par Palpation:MEDIAL JOINT LINE PAIN, PERIPATELLAR PAIN, NO PAIN AT patellar tendon, MFC/LFC, Medial/Lateral Tibial Plateau\par Leg Length Discrepancy:no\par Patella: no apprehension\par Distal Pulses: normal\par Lower Extremity Strength:normal, 5/5 \par Lower Extremity Reflexes:normal, 2+\par Lower Extremity Sensation: normal\par \par Special Tests:\par [facility]:Negative \par Bradley: Negative\par Anterior Drawer:Negative\par Posterior Drawer:Negative \par Varus/Valgus:Negative, no instability\par  [de-identified] : Xray Bilateral Knees - Multiple views were reviewed with the patient in detail.  There is no acute fracture or dislocation.  There is left knee effusion.  Bilateral tricompartment osteoarthritis, worse on right.  We will await formal radiology reading.\par \par

## 2022-03-07 NOTE — HISTORY OF PRESENT ILLNESS
[de-identified] : The patient is a 80 year old man presenting for follow-up for knee pain.\par \par He was previously seen by Dr. Poon in 2019 for right knee OA and had a cortisone injection.\par \par The patient was last seen about 11 months ago for chronic, atraumatic, right medial knee pain after wearing new dress shoes.  The pain started with some posterior knee/calf pain.  He was sent for LE doppler which was negative for DVT.  He was also sent for MRI of right knee showing:\par \par Complex degenerative type tear of the posterior root, horn, and free edge of the body of the lateral meniscus with partial extrusion of the body. Degenerative type tear of the body, posterior horn, root attachment of the medial meniscus with medial extrusion. Acute on chronic grade 1 MCL sprain. Advanced medial compartment chondromalacia. Subchondral edema in the medial tibial plateau which may represent bone contusion, stress reaction, or insufficiency changes. 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 Moderate effusion with synovitis and anterior soft tissue swelling. Popliteus muscle strain.\par \par He was counseled on modified weightbearing with a walking cane, and use of a  brace.   This was followed by Gel-One injection, and ultimately a corticosteroid injection.    His right knee has been stable.\par \par He now presents with a several week history of subacute, atraumatic left knee pain and swelling.  No new injury.  He has known history of left knee OA.\par \par \par

## 2022-03-07 NOTE — PROCEDURE
[de-identified] : Ultrasound-Guided LEFT Knee Arthrocentesis\par \par Indication for U/S Guidance: Ensure placement within the tibiofemoral joint for diagnostic purposes, while avoiding neurovascular structures\par \par Indication for Injection: KNEE EFFUSION, KNEE OA\par \par A discussion was had with the patient regarding this procedure and all questions were answered. All risks, benefits and alternatives were discussed. These included but were not limited to bleeding, infection, and allergic reaction. A timeout was done to ensure correct side and pt agreed to the procedure. Betadine was used to sterilize and prep the area, and alcohol was used to clean the skin in the anterior aspect of the knee joint. The suprapatellar space was visualized utilizing the Sonosite, linear transducer. The joint was visualized in the short axis and an in-plane approach was used for the injection. Ultrasound guidance was utilized to ensure accuracy of the intra-articular aspiration, and avoid the neurovascular structures. A 25-gauge 1.5" needle was used to inject 2cc of 1% lidocaine without epi into the SubQ.  This was followed by aspiration with an 18-gauge 1.5" needle with aspiration of 15cc of nonpurulent, clear yellow synovial fluid. . This was followed by 2cc of 1% lidocaine without epi, 0.5cc of 0.5% bupivicaine and 1cc of kenalog.  A sterile bandage was then applied. The patient tolerated the procedure well and there were no complications.\par

## 2022-03-07 NOTE — ED ADULT NURSE NOTE - OBJECTIVE STATEMENT
81y Male c/o L ankle pain x 1 week. Pmhx HLD. Pt reports " I caught a cold in my L ankle. A week ago, I slept with my foot uncovered, and now I cant sleep because of the pain". Pt reports sensitivity to cold air and to bed sheets. No obvious deformity noted. Denies any injury, B/l pulses and sensation present, full range of motion, able to bear weight on affected extremity. Pt denies Fevers, chills. N/V/D.

## 2022-03-07 NOTE — ED PROVIDER NOTE - OBJECTIVE STATEMENT
82 y/o male with a PMHx of HLD (simvastatin) is here in the ED c/o discomfort to his left ankle x1 week. Pt states the pain started the morning after he forgot to place to his foot under the warm covers. Pt states he has a "cold" on his ankle that he googled online. Pt reports the pain has caused him trouble sleeping. He has been taking melatonin without improvement of his sleep or discomfort to his foot. He denies the following: fever, chills, numbness/tingling to his foot, swelling, redness, itching, fall/injury, swelling, skin changes. Pt admits he is able to walk without any issues or pain.

## 2022-03-07 NOTE — ED PROVIDER NOTE - PATIENT PORTAL LINK FT
You can access the FollowMyHealth Patient Portal offered by [facility] by registering at the following website: [email or web address]. By joining [employer]’s FollowMyHealth portal, you will also be able to view your health information using other applications (apps) compatible with our system.

## 2022-03-07 NOTE — ED PROVIDER NOTE - CARE PROVIDER_API CALL
Paco Sullivan (LISEM)  Podiatric Medicine and Surgery  [address], [address]  [place] [address]  Phone: [phone]  Fax: [phone]  Follow Up Time:

## 2022-03-07 NOTE — ED PROVIDER NOTE - CLINICAL SUMMARY MEDICAL DECISION MAKING FREE TEXT BOX
80 y/o male here in the ED c/o about discomfort to his left foot that has interfered with his sleep. No sxs of injury, therefore pt agrees no xray indicated. Not consistent with claudication, venous insufficiency, septic joint, cellulitis. Denies pain with walking. Pt otherwise NVI and soft compartment. Unremarkable exam. Pt advised to follow-up with Podiatry. I have discussed the discharge plan with the patient. The patient agrees with the plan, as discussed.  The patient understands Emergency Department diagnosis is a preliminary diagnosis often based on limited information and that the patient must adhere to the follow-up plan as discussed.  The patient understands that if the symptoms worsen or if prescribed medications do not have the desired/planned effect that the patient may return to the Emergency Department at any time for further evaluation and treatment.

## 2022-03-07 NOTE — DISCUSSION/SUMMARY
[Medication Risks Reviewed] : Medication risks reviewed [de-identified] : The patient is a 80 year old man presenting with chronic left knee pain secondary to knee osteoarthritis.  Also right knee OA, but asymptomatic today.\par \par We again discussed natural progression of arthritis, as well as nonoperative and operative treatment options.\par \par Patient understands that they may require surgery in the future.  He defers for now.\par \par After informed consent, and explanation of risks, benefits, alternatives, adverse effects of injection, which includes but is not limited to infection, bleeding, allergic reaction, swelling, soft tissue weakening/tendon rupture, neurovascular injury, injection site complication, fat atrophy, skin depigmentation, failure to improve symptoms, the patient would like to proceed with the procedure - LEFT KNEE ASPIRATION/CSI. See procedure note above. Patient tolerated the procedure well. The patient was provided with postinjection instructions.\par \par Trial HA injections.\par \par Bilateral MONOVISC injections ordered today.\par \par Follow-up in 8 weeks.\par \par ------------------------------------------------------------------------------------------------------------------\par Patient appreciates and agrees with current plan.\par \par The patient's diagnosis above was evaluated by me, personally.  Diagnostic Testing and treatment options were discussed with the patient in detail.  The risks/benefits/potential complications of diagnostic testing/treatments were described in detail.  \par \par This note was generated using a mixture of manual typing and dragon medical dictation software.  A reasonable effort has been made for proofreading its contents, but typos may still remain.  If there are any questions or points of clarification needed please notify my office.\par \par \par >15 minutes of time was spent in total for the encounter.  >50% of the time spent was on face-to-face counseling/coordination of care and medical-decision making for this patient.\par \par \par \par \par ------------------------------------------------------------------------------------------------------------------\par Patient appreciates and agrees with current plan.\par \par This note was generated using a mixture of manual typing and dragon medical dictation software.  A reasonable effort has been made for proofreading its contents, but typos may still remain.  If there are any questions or points of clarification needed please notify my office.\par \par >15 minutes of time was spent on total encounter.  >50% of the visit was spent on counseling/coordination of care and medical-decision making for this patient.\par \par \par

## 2022-03-07 NOTE — ED PROVIDER NOTE - NS ED ROS FT
General: no fever, chills, confusion  Cardiac: no chest pain, chest tightness, palpitations  Lungs: no sob, difficulty breathing  Abdomen: no abdominal pain, nausea, vomiting, diarrhea, constipation, nml BM  : no dysuria, urinary frequency/urgency  MSK: [I have a "cold" on my left ankle]    All other systems negative except as per HPI

## 2022-03-10 DIAGNOSIS — M25.572 PAIN IN LEFT ANKLE AND JOINTS OF LEFT FOOT: ICD-10-CM

## 2022-03-10 DIAGNOSIS — E78.5 HYPERLIPIDEMIA, UNSPECIFIED: ICD-10-CM

## 2022-03-18 PROBLEM — Z78.9 OTHER SPECIFIED HEALTH STATUS: Chronic | Status: ACTIVE | Noted: 2022-03-07

## 2022-03-21 ENCOUNTER — RESULT REVIEW (OUTPATIENT)
Age: 82
End: 2022-03-21

## 2022-03-21 ENCOUNTER — OUTPATIENT (OUTPATIENT)
Dept: OUTPATIENT SERVICES | Facility: HOSPITAL | Age: 82
LOS: 1 days | End: 2022-03-21
Payer: MEDICARE

## 2022-03-21 ENCOUNTER — APPOINTMENT (OUTPATIENT)
Dept: ORTHOPEDIC SURGERY | Facility: CLINIC | Age: 82
End: 2022-03-21
Payer: MEDICARE

## 2022-03-21 VITALS
SYSTOLIC BLOOD PRESSURE: 139 MMHG | DIASTOLIC BLOOD PRESSURE: 76 MMHG | OXYGEN SATURATION: 97 % | HEART RATE: 62 BPM | WEIGHT: 210 LBS | BODY MASS INDEX: 31.83 KG/M2 | HEIGHT: 68 IN | TEMPERATURE: 98.5 F

## 2022-03-21 DIAGNOSIS — M25.572 PAIN IN LEFT ANKLE AND JOINTS OF LEFT FOOT: ICD-10-CM

## 2022-03-21 PROCEDURE — 20604 DRAIN/INJ JOINT/BURSA W/US: CPT | Mod: LT

## 2022-03-21 PROCEDURE — 73610 X-RAY EXAM OF ANKLE: CPT

## 2022-03-21 PROCEDURE — 99212 OFFICE O/P EST SF 10 MIN: CPT | Mod: 25

## 2022-03-21 PROCEDURE — 73610 X-RAY EXAM OF ANKLE: CPT | Mod: 26,LT

## 2022-03-21 NOTE — DISCUSSION/SUMMARY
[Medication Risks Reviewed] : Medication risks reviewed [de-identified] : The patient is a 81 year old man with history of knee OA presenting with a several week history of left lateral hindfoot pain.  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  All questions were answered appropriately.\par \par The patient was counseled on the natural progression of the problem(s) today and potential complications of diagnoses.  The patient was provided reassurance today.\par \par After informed consent, and explanation of risks, benefits, alternatives, adverse effects of injection, which includes but is not limited to infection, bleeding, allergic reaction, swelling, soft tissue weakening/tendon rupture, neurovascular injury, injection site complication, fat atrophy, skin depigmentation, failure to improve symptoms, the patient would like to proceed with the procedure - LEFT SINUS TARSI ULTRASOUND-GUIDED CORTICOSTEROID INJECTION. See procedure note above. Patient tolerated the procedure well. The patient was provided with postinjection instructions.\par \par If symptoms persist, consider working up peripheral neuropathy.\par \par ------------------------------------------------------------------------------------------------------------------\par Patient appreciates and agrees with current plan.\par \par The patient's diagnosis above was evaluated by me, personally.  Diagnostic Testing and treatment options were discussed with the patient in detail.  The risks/benefits/potential complications of diagnostic testing/treatments were described in detail.  \par \par This note was generated using a mixture of manual typing and dragon medical dictation software.  A reasonable effort has been made for proofreading its contents, but typos may still remain.  If there are any questions or points of clarification needed please notify my office.\par \par \par >15 minutes of time was spent in total for the encounter.  >50% of the time spent was on face-to-face counseling/coordination of care and medical-decision making for this patient.\par \par \par

## 2022-03-21 NOTE — PHYSICAL EXAM
[de-identified] : General: Well-nourished, well-developed, alert, and in no acute distress.\par Head: Normocephalic.\par Eyes: Pupils equal round reactive to light and accommodation, extraocular muscles intact, normal sclera.\par Nose: No nasal discharge.\par Cardiac: Regular rate. Extremities are warm and well perfused. Distal pulses are symmetric bilaterally.\par Respiratory: No labored breathing.\par Extremities: Sensation is intact distally bilaterally.  Distal pulses are symmetric bilaterally\par Lymphatic: No regional lymphadenopathy, no lymphedema\par Neurologic: No focal deficits\par Skin: Normal skin color, texture, and turgor\par Psychiatric: Normal affect\par MSK: as noted above/below\par \par \par \par RIGHT ANKLE:\par \par Inspection: no swelling, ecchymosis, gross deformity\par Palpation: NO pain at ATFL, CFL,deltoid ligament, joint line pain, lateral malleolus pain, medial malleolus pain, achilles pain,  fibular head pain, 5th metatarsal pain, tarsal or distal phalanx pain\par ROM: normal ankle dorsiflexion, ankle plantarflexion, eversion, inversion\par Strength: 5/5\par Neurovascular: 2+ pulses distally\par Sensation: normal\par \par Special Tests: \par Anterior Drawer: Negative\par Talar Tilt: Negative\par Lower Extremity Squeeze/Compression: Negative\par External Rotation at Tib-Fib Syndesmosis: Negative\par Zambrano Test: Negative\par Gait: Normal Reciprocal Gait\par \par LEFT ANKLE:\par \par Inspection: no ecchymosis, gross deformity\par Palpation: PAIN AT SINUS TARSI, NO pain at ATFL, CFL,deltoid ligament, joint line pain, lateral malleolus pain, medial malleolus pain, achilles pain,  fibular head pain, 5th metatarsal pain, tarsal or distal phalanx pain\par ROM: normal ankle dorsiflexion, ankle plantarflexion, eversion, inversion\par Strength: 5/5\par Neurovascular: 2+ pulses distally\par Sensation: normal\par \par Special Tests: \par Anterior Drawer: Negative\par Talar Tilt: Negative\par Lower Extremity Squeeze/Compression: Negative\par External Rotation at Tib-Fib Syndesmosis: Negative\par Zambrano Test: Negative\par Gait: Normal Reciprocal Gait\par  [de-identified] : Xray LEFT Ankle - Multiple views were reviewed with the patient in detail.  There is no acute fracture or dislocation.  There is no joint effusion.  Mild midfoot arthritis.  We will await formal radiology reading.\par \par

## 2022-03-21 NOTE — PROCEDURE
[de-identified] : Procedure: Ultrasound-Guided LEFT SINUS TARSI\par Indication for Ultrasound: To avoid neurovascular structures and avoid direct damage to tendons/ligaments\par \par Injection: LEFT HINDFOOT.\par Indication: SINUS TARSI SYNDROME\par \par A discussion was had with the patient regarding this procedure and all questions were answered. All risks, benefits and alternatives were discussed. These included but were not limited to bleeding, infection, injection site reaction/complication and allergic reaction.  A timeout was done to ensure correct side and pt agreed to the procedure.   Betadine was used to sterilize and prep the area, and alcohol was used to clean the skin of the hindfoot/dorsum of foot. Utilizing the Sonosite linear transducer, sterilized probe, the sinus tarsi was identified.  Utilizing a transverse axis view, the injection was performed in using an out-of-plane technique.  A 25-gauge needle was used to inject 2cc of 1% lidocaine and 1=0.5 cc of kenalog in the sinus tarsi. A sterile bandage was then applied. The patient tolerated the procedure well and there were no complications.

## 2022-03-21 NOTE — HISTORY OF PRESENT ILLNESS
[de-identified] : The patient is a 81 year old man with history of knee OA presenting with left ankle pain\par \par He presents with a several week history of left lateral hindfoot pain.  The patient denies precipitating injury or trauma.  He is able to bear weight without issue. Pain is localized to sinus tarsi.  He feels occasional "cold" sensation at the dorsum of his foot.  No pain above ankle.  No history of diabetes or neuropathy.  He went to a podiatrist who prescribed voltaren gel which has not improved symptoms.\par \par Pain is mild, described as cold, without alleviating/aggravating factors.

## 2022-05-29 NOTE — REVIEW OF SYSTEMS
Attempted to call patient x 2. No answer.    Reason for Disposition   Second attempt to contact caller AND no contact made. Phone number verified.    Protocols used: NO CONTACT OR DUPLICATE CONTACT CALL-A-AH       [Negative] : Heme/Lymph

## 2022-07-28 ENCOUNTER — APPOINTMENT (OUTPATIENT)
Dept: NEUROLOGY | Facility: CLINIC | Age: 82
End: 2022-07-28

## 2023-02-13 ENCOUNTER — APPOINTMENT (OUTPATIENT)
Dept: VASCULAR SURGERY | Facility: CLINIC | Age: 83
End: 2023-02-13
Payer: MEDICARE

## 2023-02-13 VITALS
WEIGHT: 210 LBS | HEIGHT: 68 IN | BODY MASS INDEX: 31.83 KG/M2 | SYSTOLIC BLOOD PRESSURE: 121 MMHG | HEART RATE: 61 BPM | DIASTOLIC BLOOD PRESSURE: 69 MMHG

## 2023-02-13 DIAGNOSIS — I83.90 ASYMPTOMATIC VARICOSE VEINS OF UNSPECIFIED LOWER EXTREMITY: ICD-10-CM

## 2023-02-13 DIAGNOSIS — Z85.46 PERSONAL HISTORY OF MALIGNANT NEOPLASM OF PROSTATE: ICD-10-CM

## 2023-02-13 DIAGNOSIS — R60.0 LOCALIZED EDEMA: ICD-10-CM

## 2023-02-13 PROCEDURE — 93970 EXTREMITY STUDY: CPT

## 2023-02-13 PROCEDURE — 99204 OFFICE O/P NEW MOD 45 MIN: CPT

## 2023-02-13 RX ORDER — SIMVASTATIN 10 MG/1
10 TABLET, FILM COATED ORAL
Refills: 0 | Status: DISCONTINUED | COMMUNITY
End: 2023-02-13

## 2023-02-13 RX ORDER — HYALURONATE SOD, CROSS-LINKED 30 MG/3 ML
30 SYRINGE (ML) INTRAARTICULAR
Qty: 1 | Refills: 0 | Status: DISCONTINUED | COMMUNITY
Start: 2020-12-17 | End: 2023-02-13

## 2023-02-13 RX ORDER — HYALURONATE SODIUM, STABILIZED 88 MG/4 ML
88 SYRINGE (ML) INTRAARTICULAR
Qty: 2 | Refills: 0 | Status: DISCONTINUED | COMMUNITY
Start: 2022-03-07 | End: 2023-02-13

## 2023-02-13 RX ORDER — HYALURONATE SODIUM, STABILIZED 88 MG/4 ML
88 SYRINGE (ML) INTRAARTICULAR
Qty: 1 | Refills: 0 | Status: DISCONTINUED | COMMUNITY
Start: 2021-04-22 | End: 2023-02-13

## 2023-02-13 RX ORDER — ETODOLAC 400 MG/1
400 TABLET, FILM COATED ORAL TWICE DAILY
Qty: 60 | Refills: 0 | Status: DISCONTINUED | COMMUNITY
Start: 2020-11-05 | End: 2023-02-13

## 2023-02-13 RX ORDER — MULTIVIT-MIN/FOLIC ACID/LUTEIN 400-250MCG
TABLET,CHEWABLE ORAL
Refills: 0 | Status: DISCONTINUED | COMMUNITY
End: 2023-02-13

## 2023-02-14 PROBLEM — I83.90 VARICOSE VEINS: Status: ACTIVE | Noted: 2023-02-14

## 2023-02-14 PROBLEM — R60.0 LOWER LEG EDEMA: Status: ACTIVE | Noted: 2023-02-14

## 2023-02-14 PROBLEM — Z85.46 HISTORY OF MALIGNANT NEOPLASM OF PROSTATE: Status: RESOLVED | Noted: 2023-02-14 | Resolved: 2023-02-14

## 2023-02-14 RX ORDER — CHLORTHALIDONE 50 MG/1
TABLET ORAL
Refills: 0 | Status: ACTIVE | COMMUNITY

## 2023-02-14 NOTE — HISTORY OF PRESENT ILLNESS
[FreeTextEntry1] : 83 yo M with PMHx of Prostate Ca who presents for an evaluation of bilateral LEs edema and varicose veins. Patient states that he was seen at [facility] and was recommended a procedure, however he decided to get a second opinion and see if this procedure is necessary. He states that he noticed varicose veins over his lower legs, however he denies pain, burning, and has some edema from knees down to his feet. He denies claudication, rest pain, history of SVT/DVT, skin changes or breakdown. he ambulates without difficulties.

## 2023-02-14 NOTE — PROCEDURE
[FreeTextEntry1] : BL venous doppler: RLE no evidence of DVT, deep reflux noted in FV, pop vein, SSV reflux, LLE deep venous insufficiency in pop. 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 Varicose veins noted in calves bilaterally

## 2023-02-14 NOTE — PHYSICAL EXAM
[Respiratory Effort] : normal respiratory effort [Normal Heart Sounds] : normal heart sounds [2+] : left 2+ [1+] : left 1+ [Ankle Swelling (On Exam)] : present [Ankle Swelling Bilaterally] : bilaterally  [Ankle Swelling On The Left] : moderate [Varicose Veins Of Lower Extremities] : bilaterally [Ankle Swelling On The Right] : mild [] : not present [Abdomen Tenderness] : ~T ~M No abdominal tenderness [Alert] : alert [Calm] : calm [de-identified] : WN/WD, NAD [de-identified] : NC/AT [de-identified] : supple [de-identified] : +FROM [de-identified] : small varicose veins noted over lower legs

## 2023-02-14 NOTE — ADDENDUM
[FreeTextEntry1] : I, Dr.Timothy Garcia, personally performed the evaluation and management (E/M) services for this new patient.  That E/M includes conducting the initial examination, assessing all conditions, and establishing the plan of care.  Today, my ACP, Jolynn Santo PAC, was here to observe my evaluation and management services for this patient to be followed going forward.\par \par \par

## 2023-02-14 NOTE — ASSESSMENT
[FreeTextEntry1] : 81 yo M with PMHx of Prostate Ca who presents for an evaluation of bilateral LEs edema and varicose veins. Patient states that he was seen at [facility] and was recommended a procedure, however he decided to get a second opinion and see if this procedure is necessary.\par Bilateral legs moderate edema and small varicosities, legs are well perfused , no skin changes.\par BL venous doppler was done in the office demonstrating RLE no evidence of DVT, deep reflux noted in FV, pop vein, SSV reflux, LLE deep venous insufficiency in pop. 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 Varicose veins noted in calves bilaterally.\par We discussed the findings and explained that no vascular intervention is needed at this time.\par He should wear compression stockings and elevate his legs. All his questions were answered.\par He may f/u here as needed.\par

## 2023-11-02 ENCOUNTER — APPOINTMENT (OUTPATIENT)
Dept: GASTROENTEROLOGY | Facility: CLINIC | Age: 83
End: 2023-11-02
Payer: MEDICARE

## 2023-11-02 VITALS
DIASTOLIC BLOOD PRESSURE: 69 MMHG | RESPIRATION RATE: 16 BRPM | WEIGHT: 208.6 LBS | OXYGEN SATURATION: 98 % | HEIGHT: 68 IN | SYSTOLIC BLOOD PRESSURE: 117 MMHG | BODY MASS INDEX: 31.61 KG/M2 | TEMPERATURE: 97.4 F | HEART RATE: 68 BPM

## 2023-11-02 DIAGNOSIS — Z12.11 ENCOUNTER FOR SCREENING FOR MALIGNANT NEOPLASM OF COLON: ICD-10-CM

## 2023-11-02 PROCEDURE — 99203 OFFICE O/P NEW LOW 30 MIN: CPT

## 2024-10-25 NOTE — PROCEDURE
- Persistent decreased libido complaints, will plan to check testosterone level with blood work  -At this time I would avoid any over-the-counter products referencing male enhancement/libido improvement as many of these products are not verified in regards to their claims   [de-identified] : Right knee injection:\par Verbal consent obtained, risks benefits clearly explained, normal sterile technique utilized. Joint was injected using 4cc's of 1% lidocaine and 1cc of 40mg/mL kenalog. Patient tolerated well.\par

## 2025-03-14 ENCOUNTER — NON-APPOINTMENT (OUTPATIENT)
Age: 85
End: 2025-03-14

## 2025-03-14 DIAGNOSIS — B35.1 TINEA UNGUIUM: ICD-10-CM

## 2025-03-14 DIAGNOSIS — L84 CORNS AND CALLOSITIES: ICD-10-CM

## 2025-03-14 RX ORDER — ARIPIPRAZOLE 5 MG/1
5 TABLET ORAL
Refills: 0 | Status: ACTIVE | COMMUNITY

## 2025-03-14 RX ORDER — CLONAZEPAM 0.5 MG/1
0.5 TABLET ORAL
Refills: 0 | Status: ACTIVE | COMMUNITY

## 2025-03-14 RX ORDER — KETOCONAZOLE 20 MG/ML
2 SUSPENSION TOPICAL
Refills: 0 | Status: ACTIVE | COMMUNITY

## 2025-03-14 RX ORDER — SIMVASTATIN 10 MG/1
10 TABLET, FILM COATED ORAL
Refills: 0 | Status: ACTIVE | COMMUNITY

## 2025-03-14 RX ORDER — ZOLPIDEM TARTRATE 10 MG/1
10 TABLET ORAL
Refills: 0 | Status: ACTIVE | COMMUNITY

## 2025-03-14 RX ORDER — CITALOPRAM HYDROBROMIDE 40 MG/1
40 TABLET, FILM COATED ORAL
Refills: 0 | Status: ACTIVE | COMMUNITY